# Patient Record
Sex: MALE | Race: WHITE | NOT HISPANIC OR LATINO | Employment: FULL TIME | URBAN - METROPOLITAN AREA
[De-identification: names, ages, dates, MRNs, and addresses within clinical notes are randomized per-mention and may not be internally consistent; named-entity substitution may affect disease eponyms.]

---

## 2022-01-14 ENCOUNTER — OFFICE VISIT (OUTPATIENT)
Dept: FAMILY MEDICINE CLINIC | Facility: CLINIC | Age: 52
End: 2022-01-14

## 2022-01-14 VITALS
HEART RATE: 74 BPM | HEIGHT: 70 IN | SYSTOLIC BLOOD PRESSURE: 132 MMHG | TEMPERATURE: 98.4 F | WEIGHT: 220.2 LBS | BODY MASS INDEX: 31.52 KG/M2 | DIASTOLIC BLOOD PRESSURE: 96 MMHG | RESPIRATION RATE: 16 BRPM | OXYGEN SATURATION: 98 %

## 2022-01-14 DIAGNOSIS — Z13.220 SCREENING FOR HYPERLIPIDEMIA: ICD-10-CM

## 2022-01-14 DIAGNOSIS — I10 PRIMARY HYPERTENSION: Primary | ICD-10-CM

## 2022-01-14 PROCEDURE — 99204 OFFICE O/P NEW MOD 45 MIN: CPT | Performed by: STUDENT IN AN ORGANIZED HEALTH CARE EDUCATION/TRAINING PROGRAM

## 2022-01-14 RX ORDER — LOSARTAN POTASSIUM 50 MG/1
50 TABLET ORAL DAILY
Qty: 30 TABLET | Refills: 1 | Status: SHIPPED | OUTPATIENT
Start: 2022-01-14 | End: 2022-03-16

## 2022-01-14 RX ORDER — LOSARTAN POTASSIUM 50 MG/1
50 TABLET ORAL DAILY
COMMUNITY
End: 2022-01-14 | Stop reason: SDUPTHER

## 2022-01-14 NOTE — PATIENT INSTRUCTIONS
"REQUEST RECORDS FROM [facility] [employer] FOR PAST 3 YEARS   https://www.nhl.nih.gov/files/docs/public/heart/dash_brief.pdf\">   DASH Eating Plan  DASH stands for Dietary Approaches to Stop Hypertension. The DASH eating plan is a healthy eating plan that has been shown to:  · Reduce high blood pressure (hypertension).  · Reduce your risk for type 2 diabetes, heart disease, and stroke.  · Help with weight loss.  What are tips for following this plan?  Reading food labels  · Check food labels for the amount of salt (sodium) per serving. Choose foods with less than 5 percent of the Daily Value of sodium. Generally, foods with less than 300 milligrams (mg) of sodium per serving fit into this eating plan.  · To find whole grains, look for the word \"whole\" as the first word in the ingredient list.  Shopping  · Buy products labeled as \"low-sodium\" or \"no salt added.\"  · Buy fresh foods. Avoid canned foods and pre-made or frozen meals.  Cooking  · Avoid adding salt when cooking. Use salt-free seasonings or herbs instead of table salt or sea salt. Check with your health care provider or pharmacist before using salt substitutes.  · Do not coughlin foods. Cook foods using healthy methods such as baking, boiling, grilling, roasting, and broiling instead.  · Cook with heart-healthy oils, such as olive, canola, avocado, soybean, or sunflower oil.  Meal planning    · Eat a balanced diet that includes:  ? 4 or more servings of fruits and 4 or more servings of vegetables each day. Try to fill one-half of your plate with fruits and vegetables.  ? 6-8 servings of whole grains each day.  ? Less than 6 oz (170 g) of lean meat, poultry, or fish each day. A 3-oz (85-g) serving of meat is about the same size as a deck of cards. One egg equals 1 oz (28 g).  ? 2-3 servings of low-fat dairy each day. One serving is 1 cup (237 mL).  ? 1 serving of nuts, seeds, or beans 5 times each week.  ? 2-3 servings of heart-healthy fats. Healthy fats called omega-3 " fatty acids are found in foods such as walnuts, flaxseeds, fortified milks, and eggs. These fats are also found in cold-water fish, such as sardines, salmon, and mackerel.  · Limit how much you eat of:  ? Canned or prepackaged foods.  ? Food that is high in trans fat, such as some fried foods.  ? Food that is high in saturated fat, such as fatty meat.  ? Desserts and other sweets, sugary drinks, and other foods with added sugar.  ? Full-fat dairy products.  · Do not salt foods before eating.  · Do not eat more than 4 egg yolks a week.  · Try to eat at least 2 vegetarian meals a week.  · Eat more home-cooked food and less restaurant, buffet, and fast food.    Lifestyle  · When eating at a restaurant, ask that your food be prepared with less salt or no salt, if possible.  · If you drink alcohol:  ? Limit how much you use to:  § 0-1 drink a day for women who are not pregnant.  § 0-2 drinks a day for men.  ? Be aware of how much alcohol is in your drink. In the [place] one drink equals one 12 oz bottle of beer (355 mL), one 5 oz glass of wine (148 mL), or one 1½ oz glass of hard liquor (44 mL).  General information  · Avoid eating more than 2,300 mg of salt a day. If you have hypertension, you may need to reduce your sodium intake to 1,500 mg a day.  · Work with your health care provider to maintain a healthy body weight or to lose weight. Ask what an ideal weight is for you.  · Get at least 30 minutes of exercise that causes your heart to beat faster (aerobic exercise) most days of the week. Activities may include walking, swimming, or biking.  · Work with your health care provider or dietitian to adjust your eating plan to your individual calorie needs.  What foods should I eat?  Fruits  All fresh, dried, or frozen fruit. Canned fruit in natural juice (without added sugar).  Vegetables  Fresh or frozen vegetables (raw, steamed, roasted, or grilled). Low-sodium or reduced-sodium tomato and vegetable juice. Low-sodium or  reduced-sodium tomato sauce and tomato paste. Low-sodium or reduced-sodium canned vegetables.  Grains  Whole-grain or whole-wheat bread. Whole-grain or whole-wheat pasta. Brown rice. Oatmeal. Quinoa. Bulgur. Whole-grain and low-sodium cereals. Briana bread. Low-fat, low-sodium crackers. Whole-wheat flour tortillas.  Meats and other proteins  Skinless chicken or turkey. Ground chicken or turkey. Pork with fat trimmed off. Fish and seafood. Egg whites. Dried beans, peas, or lentils. Unsalted nuts, nut butters, and seeds. Unsalted canned beans. Lean cuts of beef with fat trimmed off. Low-sodium, lean precooked or cured meat, such as sausages or meat loaves.  Dairy  Low-fat (1%) or fat-free (skim) milk. Reduced-fat, low-fat, or fat-free cheeses. Nonfat, low-sodium ricotta or cottage cheese. Low-fat or nonfat yogurt. Low-fat, low-sodium cheese.  Fats and oils  Soft margarine without trans fats. Vegetable oil. Reduced-fat, low-fat, or light mayonnaise and salad dressings (reduced-sodium). Canola, safflower, olive, avocado, soybean, and sunflower oils. Avocado.  Seasonings and condiments  Herbs. Spices. Seasoning mixes without salt.  Other foods  Unsalted popcorn and pretzels. Fat-free sweets.  The items listed above may not be a complete list of foods and beverages you can eat. Contact a dietitian for more information.  What foods should I avoid?  Fruits  Canned fruit in a light or heavy syrup. Fried fruit. Fruit in cream or butter sauce.  Vegetables  Creamed or fried vegetables. Vegetables in a cheese sauce. Regular canned vegetables (not low-sodium or reduced-sodium). Regular canned tomato sauce and paste (not low-sodium or reduced-sodium). Regular tomato and vegetable juice (not low-sodium or reduced-sodium). Pickles. Olives.  Grains  Baked goods made with fat, such as croissants, muffins, or some breads. Dry pasta or rice meal packs.  Meats and other proteins  Fatty cuts of meat. Ribs. Fried meat. Alejandro. Osvaldo,  salami, and other precooked or cured meats, such as sausages or meat loaves. Fat from the back of a pig (fatback). Bratwurst. Salted nuts and seeds. Canned beans with added salt. Canned or smoked fish. Whole eggs or egg yolks. Chicken or turkey with skin.  Dairy  Whole or 2% milk, cream, and half-and-half. Whole or full-fat cream cheese. Whole-fat or sweetened yogurt. Full-fat cheese. Nondairy creamers. Whipped toppings. Processed cheese and cheese spreads.  Fats and oils  Butter. Stick margarine. Lard. Shortening. Ghee. Allen fat. Tropical oils, such as coconut, palm kernel, or palm oil.  Seasonings and condiments  Onion salt, garlic salt, seasoned salt, table salt, and sea salt. [facility] Hospitalter[facility] sauce. Tartar sauce. Barbecue sauce. Teriyaki sauce. Soy sauce, including reduced-sodium. Steak sauce. Canned and packaged gravies. Fish sauce. Oyster sauce. Cocktail sauce. Store-bought horseradish. Ketchup. Mustard. Meat flavorings and tenderizers. Bouillon cubes. Hot sauces. Pre-made or packaged marinades. Pre-made or packaged taco seasonings. Relishes. Regular salad dressings.  Other foods  Salted popcorn and pretzels.  The items listed above may not be a complete list of foods and beverages you should avoid. Contact a dietitian for more information.  Where to find more information  · National Heart, Lung, and Blood [place]: www.nhlbi.nih.gov  · American Heart Association: www.heart.org  · Academy of Nutrition and Dietetics: www.eatright.org  · National Kidney Foundation: www.kidney.org  Summary  · The DASH eating plan is a healthy eating plan that has been shown to reduce high blood pressure (hypertension). It may also reduce your risk for type 2 diabetes, heart disease, and stroke.  · When on the DASH eating plan, aim to eat more fresh fruits and vegetables, whole grains, lean proteins, low-fat dairy, and heart-healthy fats.  · With the DASH eating plan, you should limit salt (sodium) intake to 2,300 mg a day. If you  have hypertension, you may need to reduce your sodium intake to 1,500 mg a day.  · Work with your health care provider or dietitian to adjust your eating plan to your individual calorie needs.  This information is not intended to replace advice given to you by your health care provider. Make sure you discuss any questions you have with your health care provider.  Document Revised: 11/20/2020 Document Reviewed: 11/20/2020  Elsevier Patient Education © 2021 Elsevier Inc.

## 2022-01-14 NOTE — PROGRESS NOTES
"  New Patient Office Visit      Subjective      Chief Complaint:  Establish Care (needing a refill of bp med, est care with a new pcp)      History of Present Illness: Jarrod Ortega is a 51 y.o. male who presents for a new patient visit.      Patient with chronic HTN. hasnt taken in about 36 hours.  History of a couple years heavy alcohol intake but now sober. Stopped 11/21.    Some chronic right wrist pain.     Works for [employer]   Past Medical History:   Diagnosis Date   • Hypertension        Past Surgical History:   Procedure Laterality Date   • APPENDECTOMY     • KNEE ARTHROSCOPY OSTEOCHONDRAL AUTOGRAFT TRANSFER SYSTEM  (OATS) PROCEDURE         Family History   Problem Relation Age of Onset   • Arthritis Mother    • Osteoporosis Mother    • Hypertension Mother    • Thyroid disease Mother    • Cancer Father         PROSTATE   • Stroke Father        Social History     Socioeconomic History   • Marital status:    Tobacco Use   • Smoking status: Never Smoker   • Smokeless tobacco: Never Used   Vaping Use   • Vaping Use: Never used   Substance and Sexual Activity   • Alcohol use: Not Currently     Comment: STOPPED 11/21   • Drug use: Never   • Sexual activity: Yes         Current Outpatient Medications:   •  losartan (COZAAR) 50 MG tablet, Take 1 tablet by mouth Daily. Take one tablet by mouth every day, Disp: 30 tablet, Rfl: 1    Allergies   Allergen Reactions   • Contrast Dye Anaphylaxis     IODINATED CONTRAST    • Iodine Anaphylaxis       Objective     Physical Exam:  Vital Signs:  /96 (BP Location: Right arm, Patient Position: Sitting, Cuff Size: Adult)   Pulse 74   Temp 98.4 °F (36.9 °C) (Temporal)   Resp 16   Ht 177.8 cm (70\")   Wt 99.9 kg (220 lb 3.2 oz)   SpO2 98%   BMI 31.60 kg/m²      Physical Exam  Constitutional:       General: He is not in acute distress.     Appearance: He is not ill-appearing.   Cardiovascular:      Rate and Rhythm: Normal rate and regular rhythm.   Pulmonary: "      Effort: Pulmonary effort is normal.      Breath sounds: Normal breath sounds.   Neurological:      Mental Status: He is alert.   Psychiatric:         Thought Content: Thought content normal.                  Assessment / Plan      Assessment/Plan:   Diagnoses and all orders for this visit:    1. Primary hypertension (Primary)  -     rx for losartan (COZAAR) 50 MG tablet; Take 1 tablet by mouth Daily. Take one tablet by mouth every day  Dispense: 30 tablet; Refill: 1  -     Comprehensive Metabolic Panel; Future        - not at goal but missed dose. Record at home and f/u in 1 month.     2. Screening for hyperlipidemia  -     Lipid Panel; Future    Can address right chronic wrist pain at next visit     Follow Up:   Return in about 4 weeks (around 2/11/2022) for Follow-up.    MDM: htn not at goal likely due to missed dose. rx      Aidan Ortega MD  Family Medicine - [facility] [place]

## 2022-03-15 DIAGNOSIS — I10 PRIMARY HYPERTENSION: ICD-10-CM

## 2022-03-15 RX ORDER — LOSARTAN POTASSIUM 100 MG/1
TABLET ORAL
Qty: 15 TABLET | Refills: 0 | OUTPATIENT
Start: 2022-03-15

## 2022-03-16 DIAGNOSIS — I10 PRIMARY HYPERTENSION: ICD-10-CM

## 2022-03-16 DIAGNOSIS — I10 PRIMARY HYPERTENSION: Primary | ICD-10-CM

## 2022-03-16 RX ORDER — LOSARTAN POTASSIUM 50 MG/1
50 TABLET ORAL DAILY
Qty: 15 TABLET | Refills: 0 | Status: SHIPPED | OUTPATIENT
Start: 2022-03-16 | End: 2022-03-18 | Stop reason: SDUPTHER

## 2022-03-16 RX ORDER — LOSARTAN POTASSIUM 100 MG/1
TABLET ORAL
Qty: 15 TABLET | Refills: 0 | Status: SHIPPED | OUTPATIENT
Start: 2022-03-16 | End: 2022-03-16

## 2022-03-17 ENCOUNTER — LAB (OUTPATIENT)
Dept: LAB | Facility: HOSPITAL | Age: 52
End: 2022-03-17

## 2022-03-17 DIAGNOSIS — I10 PRIMARY HYPERTENSION: ICD-10-CM

## 2022-03-17 DIAGNOSIS — Z13.220 SCREENING FOR HYPERLIPIDEMIA: ICD-10-CM

## 2022-03-17 LAB
ALBUMIN SERPL-MCNC: 4.6 G/DL (ref 3.5–5.2)
ALBUMIN/GLOB SERPL: 1.4 G/DL
ALP SERPL-CCNC: 82 U/L (ref 39–117)
ALT SERPL W P-5'-P-CCNC: 47 U/L (ref 1–41)
ANION GAP SERPL CALCULATED.3IONS-SCNC: 10.7 MMOL/L (ref 5–15)
AST SERPL-CCNC: 40 U/L (ref 1–40)
BILIRUB SERPL-MCNC: 0.5 MG/DL (ref 0–1.2)
BUN SERPL-MCNC: 16 MG/DL (ref 6–20)
BUN/CREAT SERPL: 14.7 (ref 7–25)
CALCIUM SPEC-SCNC: 10.2 MG/DL (ref 8.6–10.5)
CHLORIDE SERPL-SCNC: 101 MMOL/L (ref 98–107)
CHOLEST SERPL-MCNC: 252 MG/DL (ref 0–200)
CO2 SERPL-SCNC: 25.3 MMOL/L (ref 22–29)
CREAT SERPL-MCNC: 1.09 MG/DL (ref 0.76–1.27)
EGFRCR SERPLBLD CKD-EPI 2021: 83.2 ML/MIN/1.73
GLOBULIN UR ELPH-MCNC: 3.3 GM/DL
GLUCOSE SERPL-MCNC: 91 MG/DL (ref 65–99)
HDLC SERPL-MCNC: 44 MG/DL (ref 40–60)
LDLC SERPL CALC-MCNC: 148 MG/DL (ref 0–100)
LDLC/HDLC SERPL: 3.25 {RATIO}
POTASSIUM SERPL-SCNC: 4.1 MMOL/L (ref 3.5–5.2)
PROT SERPL-MCNC: 7.9 G/DL (ref 6–8.5)
SODIUM SERPL-SCNC: 137 MMOL/L (ref 136–145)
TRIGL SERPL-MCNC: 325 MG/DL (ref 0–150)
VLDLC SERPL-MCNC: 60 MG/DL (ref 5–40)

## 2022-03-17 PROCEDURE — 80053 COMPREHEN METABOLIC PANEL: CPT

## 2022-03-17 PROCEDURE — 80061 LIPID PANEL: CPT

## 2022-03-18 ENCOUNTER — OFFICE VISIT (OUTPATIENT)
Dept: FAMILY MEDICINE CLINIC | Facility: CLINIC | Age: 52
End: 2022-03-18

## 2022-03-18 VITALS
DIASTOLIC BLOOD PRESSURE: 90 MMHG | OXYGEN SATURATION: 98 % | TEMPERATURE: 97.5 F | RESPIRATION RATE: 14 BRPM | BODY MASS INDEX: 33.41 KG/M2 | WEIGHT: 233.4 LBS | SYSTOLIC BLOOD PRESSURE: 138 MMHG | HEART RATE: 94 BPM | HEIGHT: 70 IN

## 2022-03-18 DIAGNOSIS — I10 PRIMARY HYPERTENSION: Primary | ICD-10-CM

## 2022-03-18 DIAGNOSIS — E78.2 MIXED HYPERLIPIDEMIA: ICD-10-CM

## 2022-03-18 DIAGNOSIS — R74.8 ELEVATED LIVER ENZYMES: ICD-10-CM

## 2022-03-18 DIAGNOSIS — M25.531 RIGHT WRIST PAIN: ICD-10-CM

## 2022-03-18 DIAGNOSIS — Z12.11 SCREENING FOR COLON CANCER: ICD-10-CM

## 2022-03-18 PROCEDURE — 99214 OFFICE O/P EST MOD 30 MIN: CPT | Performed by: STUDENT IN AN ORGANIZED HEALTH CARE EDUCATION/TRAINING PROGRAM

## 2022-03-18 RX ORDER — LOSARTAN POTASSIUM 50 MG/1
50 TABLET ORAL DAILY
Qty: 90 TABLET | Refills: 3 | Status: SHIPPED | OUTPATIENT
Start: 2022-03-18 | End: 2023-03-17

## 2022-03-18 NOTE — PATIENT INSTRUCTIONS
Record blood pressures. 3-4 times a week.   Goals <130/80.   If always above 140 and 90 call office     Wrist brace when driving.

## 2022-03-18 NOTE — PROGRESS NOTES
"  Established Patient Office Visit      Subjective      Chief Complaint:  Hypertension (Follow up. Just did labs yesterday. )      History of Present Illness: Jarrod Ortega is a 51 y.o. male who presents for hypertension and wrist pain      Riley has pain to the dorsal right hand.  Present for several months.  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  Worsening when flexing his wrist.  No weakness or numbness.  No distribution in ulnar or median distribution    Patient has not checked his blood pressures at home.    Past Medical History:   Diagnosis Date   • Hypertension        Patient Active Problem List   Diagnosis   • HTN (hypertension)   • Right wrist pain   • Mixed hyperlipidemia         Current Outpatient Medications:   •  losartan (Cozaar) 50 MG tablet, Take 1 tablet by mouth Daily., Disp: 90 tablet, Rfl: 3      Objective     Physical Exam:   Vital Signs:   /90   Pulse 94   Temp 97.5 °F (36.4 °C)   Resp 14   Ht 177.8 cm (70\")   Wt 106 kg (233 lb 6.4 oz)   SpO2 98%   BMI 33.49 kg/m²      Physical Exam  Constitutional:       General: He is not in acute distress.     Appearance: He is not ill-appearing.   Cardiovascular:      Rate and Rhythm: Normal rate and regular rhythm.  No murmur  Pulmonary:      Effort: Pulmonary effort is normal.      Breath sounds: Normal breath sounds.   Neurological:      Mental Status: He is alert.   Psychiatric:         Thought Content: Thought content normal.      Right wrist: No pain to palpation of dorsal wrist or hand.  Strength preserved to flexion and dorsiflexion the wrist.  No pain with dorsiflexion.  Minimal tenderness over the flexor tendons.    Assessment / Plan      Assessment/Plan:   Diagnoses and all orders for this visit:    1. Primary hypertension (Primary)  -     Comprehensive Metabolic Panel; Future  -     losartan (Cozaar) 50 MG tablet; Take 1 tablet by mouth Daily.  Dispense: 90 tablet; Refill: 3  -     We will hold off on " titration today and check pressures at home.  Increase if not at goal call if not at goal.    2.  Mixed hyperlipidemia  -     Lipid Panel; Future  -     ASCVD risk 6.7 and not candidate for medication discussed healthy diet and weight loss    3. Elevated liver enzymes        -     Repeat CMP before well visit    4. Screening for colon cancer  -     Cologuard - Stool, Per Rectum; Future    5. Right wrist pain  Dorsal wrist pain, likely slight flexor tendinitis versus entrapment secondary to driving position.  Recommended brace while driving if not improving send to Ortho    Follow Up:   Return in about 3 months (around 6/18/2022) for Wellness visit.      Aidan Ortega MD  Family Medicine - [facility] [place]

## 2022-03-19 PROBLEM — E78.2 MIXED HYPERLIPIDEMIA: Status: ACTIVE | Noted: 2022-03-19

## 2022-07-09 ENCOUNTER — TELEPHONE ENCOUNTER (OUTPATIENT)
Dept: URBAN - METROPOLITAN AREA CLINIC 121 | Facility: CLINIC | Age: 52
End: 2022-07-09

## 2022-07-10 ENCOUNTER — TELEPHONE ENCOUNTER (OUTPATIENT)
Dept: URBAN - METROPOLITAN AREA CLINIC 121 | Facility: CLINIC | Age: 52
End: 2022-07-10

## 2023-03-17 DIAGNOSIS — I10 PRIMARY HYPERTENSION: ICD-10-CM

## 2023-03-17 RX ORDER — LOSARTAN POTASSIUM 50 MG/1
TABLET ORAL
Qty: 90 TABLET | Refills: 0 | Status: SHIPPED | OUTPATIENT
Start: 2023-03-17

## 2023-03-17 NOTE — TELEPHONE ENCOUNTER
Please let patient know he needs to make an appointment.  I will give 90-day refill but no further refills.  Needs lab to follow medicine

## 2023-03-17 NOTE — TELEPHONE ENCOUNTER
Rx Refill Note  Requested Prescriptions     Pending Prescriptions Disp Refills   • losartan (COZAAR) 50 MG tablet [Pharmacy Med Name: LOSARTAN POTASSIUM 50 MG TAB] 90 tablet 0     Sig: TAKE ONE TABLET BY MOUTH EVERY DAY      Last office visit with prescribing clinician: 3/18/2022   Last telemedicine visit with prescribing clinician: Visit date not found   Next office visit with prescribing clinician: Visit date not found                         Would you like a call back once the refill request has been completed: [] Yes [] No    If the office needs to give you a call back, can they leave a voicemail: [] Yes [] No    Dottie Anglin MA  03/17/23, 09:34 EDT

## 2023-05-05 ENCOUNTER — OFFICE VISIT (OUTPATIENT)
Dept: FAMILY MEDICINE CLINIC | Facility: CLINIC | Age: 53
End: 2023-05-05
Payer: COMMERCIAL

## 2023-05-05 VITALS
BODY MASS INDEX: 34.27 KG/M2 | SYSTOLIC BLOOD PRESSURE: 150 MMHG | WEIGHT: 239.4 LBS | RESPIRATION RATE: 21 BRPM | OXYGEN SATURATION: 98 % | DIASTOLIC BLOOD PRESSURE: 102 MMHG | HEIGHT: 70 IN | TEMPERATURE: 97.7 F | HEART RATE: 123 BPM

## 2023-05-05 DIAGNOSIS — I10 PRIMARY HYPERTENSION: ICD-10-CM

## 2023-05-05 DIAGNOSIS — R10.9 ACUTE LEFT FLANK PAIN: Primary | ICD-10-CM

## 2023-05-05 DIAGNOSIS — R10.9 LEFT LATERAL ABDOMINAL PAIN: ICD-10-CM

## 2023-05-05 DIAGNOSIS — I10 HYPERTENSION, UNSPECIFIED TYPE: ICD-10-CM

## 2023-05-05 LAB
BILIRUB BLD-MCNC: NEGATIVE MG/DL
CLARITY, POC: CLEAR
COLOR UR: YELLOW
EXPIRATION DATE: ABNORMAL
GLUCOSE UR STRIP-MCNC: NEGATIVE MG/DL
KETONES UR QL: NEGATIVE
LEUKOCYTE EST, POC: NEGATIVE
Lab: ABNORMAL
NITRITE UR-MCNC: NEGATIVE MG/ML
PH UR: 6 [PH] (ref 5–8)
PROT UR STRIP-MCNC: ABNORMAL MG/DL
RBC # UR STRIP: ABNORMAL /UL
SP GR UR: 1.03 (ref 1–1.03)
UROBILINOGEN UR QL: NORMAL

## 2023-05-05 PROCEDURE — 99214 OFFICE O/P EST MOD 30 MIN: CPT | Performed by: NURSE PRACTITIONER

## 2023-05-05 PROCEDURE — 81003 URINALYSIS AUTO W/O SCOPE: CPT | Performed by: NURSE PRACTITIONER

## 2023-05-05 RX ORDER — OMEPRAZOLE 20 MG/1
20 CAPSULE, DELAYED RELEASE ORAL DAILY
COMMUNITY

## 2023-05-05 RX ORDER — LOSARTAN POTASSIUM 100 MG/1
50 TABLET ORAL DAILY
Qty: 90 TABLET | Refills: 1 | Status: SHIPPED | OUTPATIENT
Start: 2023-05-05 | End: 2023-05-05

## 2023-05-05 RX ORDER — LOSARTAN POTASSIUM 100 MG/1
100 TABLET ORAL DAILY
Qty: 90 TABLET | Refills: 1 | Status: SHIPPED | OUTPATIENT
Start: 2023-05-05

## 2023-05-05 RX ORDER — CETIRIZINE HYDROCHLORIDE 10 MG/1
10 TABLET ORAL DAILY
COMMUNITY

## 2023-05-05 NOTE — PROGRESS NOTES
"Chief Complaint  Flank Pain (Left side flank pain that started suddenly over a month ago. Pt states he did not do anything to injure it. )    Subjective          Jarrod Ortega presents to [facility]  History of Present Illness  Patient is a 52-year-old male.  He is here for complaint of pain to his left side.  States it started about 4 weeks ago he noticed it.  States it is as a tenderness in 1 specific area.  He did disclose that later it started on his right side in the same spot.  He denies any known injury.  He denies any fever, nausea, vomiting, or diarrhea.  He denies flank pain.  He states the pain is tolerable.  More of an ache.  No sharp shooting pains. He does state his stools are \"mushy\".   \"I did have pain in the same area some years ago when I was working as a .  I had radiology imaging done and it was nothing\".   Drinks about 6 oz of bourbon daily           The following portions of the patient's history were reviewed and updated as appropriate: allergies, current medications, past family history, past medical history, past social history, past surgical history and problem list.    Review of Systems   Constitutional: Negative.    HENT: Negative.    Respiratory: Negative.    Cardiovascular: Negative.    Gastrointestinal: Negative.    Musculoskeletal: Positive for back pain.   Skin: Negative.    Neurological: Negative.    Psychiatric/Behavioral: Positive for sleep disturbance.         Objective   Vital Signs:   BP (!) 150/102   Pulse (!) 123   Temp 97.7 °F (36.5 °C) (Temporal)   Resp 21   Ht 177.8 cm (70\")   Wt 109 kg (239 lb 6.4 oz)   SpO2 98%   BMI 34.35 kg/m²    BMI is >= 30 and <35. (Class 1 Obesity). The following options were offered after discussion;: exercise counseling/recommendations and nutrition counseling/recommendations      PHQ-2/9 Depression Screening  PHQ-9 Total Score: 0               Physical Exam  Vitals reviewed.   Constitutional:      "  Appearance: He is well-developed. He is not ill-appearing.   HENT:      Head: Normocephalic.      Nose: Nose normal.      Mouth/Throat:      Mouth: Mucous membranes are moist.   Eyes:      Conjunctiva/sclera: Conjunctivae normal.      Pupils: Pupils are equal, round, and reactive to light.   Cardiovascular:      Rate and Rhythm: Normal rate and regular rhythm.      Heart sounds: Normal heart sounds.   Pulmonary:      Effort: Pulmonary effort is normal.      Breath sounds: Normal breath sounds.   Abdominal:      General: Bowel sounds are normal.      Palpations: Abdomen is soft.      Tenderness: There is left CVA tenderness.   Musculoskeletal:      Cervical back: Normal range of motion.   Lymphadenopathy:      Cervical: No cervical adenopathy.   Skin:     General: Skin is warm and dry.      Capillary Refill: Capillary refill takes less than 2 seconds.   Neurological:      Mental Status: He is alert and oriented to person, place, and time.   Psychiatric:         Mood and Affect: Mood normal.         Speech: Speech normal.         Behavior: Behavior normal. Behavior is cooperative.         Thought Content: Thought content normal.        Result Review :                 Assessment and Plan    Diagnoses and all orders for this visit:    1. Acute left flank pain (Primary)    2. Hypertension, unspecified type  -     Discontinue: losartan (COZAAR) 100 MG tablet; Take 0.5 tablets by mouth Daily.  Dispense: 90 tablet; Refill: 1  -     losartan (COZAAR) 100 MG tablet; Take 1 tablet by mouth Daily.  Dispense: 90 tablet; Refill: 1  -     US Renal Bilateral; Future    3. Primary hypertension  -     Discontinue: losartan (COZAAR) 100 MG tablet; Take 0.5 tablets by mouth Daily.  Dispense: 90 tablet; Refill: 1  -     losartan (COZAAR) 100 MG tablet; Take 1 tablet by mouth Daily.  Dispense: 90 tablet; Refill: 1    4. Left lateral abdominal pain  -     POC Urinalysis Dipstick, Automated  -     US abdomen limited; Future  -     US  Renal Bilateral; Future    blood pressure is elevated. Increasing losartan to 100 mg daily.   Checking labs and US  Go to ER for severe pain or high fever.   Follow up with PCP for HTN and recheck urine. He is agreeable.           Follow Up   Return in about 2 weeks (around 5/19/2023), or blood pressure check and recheck urine.  Patient was given instructions and counseling regarding his condition or for health maintenance advice. Please see specific information pulled into the AVS if appropriate.

## 2023-06-01 ENCOUNTER — HOSPITAL ENCOUNTER (OUTPATIENT)
Dept: ULTRASOUND IMAGING | Facility: HOSPITAL | Age: 53
Discharge: HOME | End: 2023-06-01

## 2023-06-01 DIAGNOSIS — I10 HYPERTENSION, UNSPECIFIED TYPE: ICD-10-CM

## 2023-06-01 DIAGNOSIS — R10.9 LEFT LATERAL ABDOMINAL PAIN: ICD-10-CM

## 2023-06-01 PROCEDURE — 76705 ECHO EXAM OF ABDOMEN: CPT

## 2023-06-01 PROCEDURE — 76775 US EXAM ABDO BACK WALL LIM: CPT

## 2023-06-02 DIAGNOSIS — I10 PRIMARY HYPERTENSION: ICD-10-CM

## 2023-06-02 DIAGNOSIS — I10 HYPERTENSION, UNSPECIFIED TYPE: ICD-10-CM

## 2023-06-02 RX ORDER — LOSARTAN POTASSIUM 100 MG/1
100 TABLET ORAL DAILY
Qty: 90 TABLET | Refills: 1 | Status: SHIPPED | OUTPATIENT
Start: 2023-06-02

## 2023-06-02 NOTE — TELEPHONE ENCOUNTER
Rx Refill Note  Requested Prescriptions     Pending Prescriptions Disp Refills   • losartan (COZAAR) 100 MG tablet 90 tablet 1     Sig: Take 1 tablet by mouth Daily.      Last office visit with prescribing clinician: 3/18/2022   Last telemedicine visit with prescribing clinician: Visit date not found   Next office visit with prescribing clinician: Visit date not found                         Would you like a call back once the refill request has been completed: [] Yes [] No    If the office needs to give you a call back, can they leave a voicemail: [] Yes [] No    Haylie Perez MA  06/02/23, 14:42 EDT

## 2023-06-02 NOTE — TELEPHONE ENCOUNTER
[facility] PURCHASED [facility] AND CALLED TO GET PRESCRIPTION MOVED TO THIS LOCATION.  PATIENT IS NEEDING THIS SCRIPT FILLED.

## 2023-06-14 ENCOUNTER — TELEPHONE (OUTPATIENT)
Dept: FAMILY MEDICINE CLINIC | Facility: CLINIC | Age: 53
End: 2023-06-14
Payer: COMMERCIAL

## 2023-06-14 NOTE — TELEPHONE ENCOUNTER
Called patient regarding his ultrasound of kidneys and abdomen.    He does state he has changed his diet and has stopped drinking alcohol and has lost 14 pounds since his last visit.  Which is in late May.    His ultrasound of the kidneys were unremarkable.  It did incidentally show some liver parachyma suggesting steatosis.  Reviewed his ultrasound of the abdomen  And the areas of concern appear to be lipomatous.       US Renal Bilateral (06/01/2023 09:29)     US Abdomen Limited (06/01/2023 09:38)    Discussed following up with PCP for physical with fasting labs.     ANETTE Clark

## 2024-01-04 ENCOUNTER — TELEPHONE (OUTPATIENT)
Dept: FAMILY MEDICINE CLINIC | Facility: CLINIC | Age: 54
End: 2024-01-04
Payer: COMMERCIAL

## 2024-01-04 DIAGNOSIS — I10 PRIMARY HYPERTENSION: ICD-10-CM

## 2024-01-04 DIAGNOSIS — I10 HYPERTENSION, UNSPECIFIED TYPE: ICD-10-CM

## 2024-01-04 RX ORDER — LOSARTAN POTASSIUM 100 MG/1
100 TABLET ORAL DAILY
Qty: 30 TABLET | Refills: 0 | Status: SHIPPED | OUTPATIENT
Start: 2024-01-04

## 2024-01-05 NOTE — TELEPHONE ENCOUNTER
Hub can read         Call pt needs appt. 30 day rx given no further refills. Need to monitor meds and get labs       Please make pt an apt

## 2024-02-15 DIAGNOSIS — I10 HYPERTENSION, UNSPECIFIED TYPE: ICD-10-CM

## 2024-02-15 DIAGNOSIS — I10 PRIMARY HYPERTENSION: ICD-10-CM

## 2024-02-15 RX ORDER — LOSARTAN POTASSIUM 100 MG/1
100 TABLET ORAL DAILY
Qty: 30 TABLET | Refills: 0 | OUTPATIENT
Start: 2024-02-15

## 2024-02-15 NOTE — TELEPHONE ENCOUNTER
Rx Refill Note  Requested Prescriptions     Pending Prescriptions Disp Refills    losartan (COZAAR) 100 MG tablet [Pharmacy Med Name: LOSARTAN 100MG TABLETS] 30 tablet 0     Sig: TAKE 1 TABLET BY MOUTH DAILY      Last office visit with prescribing clinician: 3/18/2022   Last telemedicine visit with prescribing clinician: Visit date not found   Next office visit with prescribing clinician: Visit date not found                         Would you like a call back once the refill request has been completed: [] Yes [] No    If the office needs to give you a call back, can they leave a voicemail: [] Yes [] No    Haylie Perez MA  02/15/24, 07:46 EST

## 2024-02-19 DIAGNOSIS — I10 HYPERTENSION, UNSPECIFIED TYPE: ICD-10-CM

## 2024-02-19 DIAGNOSIS — I10 PRIMARY HYPERTENSION: ICD-10-CM

## 2024-02-19 RX ORDER — LOSARTAN POTASSIUM 100 MG/1
100 TABLET ORAL DAILY
Qty: 30 TABLET | Refills: 0 | Status: SHIPPED | OUTPATIENT
Start: 2024-02-19

## 2024-03-18 DIAGNOSIS — I10 HYPERTENSION, UNSPECIFIED TYPE: ICD-10-CM

## 2024-03-18 DIAGNOSIS — I10 PRIMARY HYPERTENSION: ICD-10-CM

## 2024-03-18 RX ORDER — LOSARTAN POTASSIUM 100 MG/1
100 TABLET ORAL DAILY
Qty: 90 TABLET | Refills: 0 | Status: SHIPPED | OUTPATIENT
Start: 2024-03-18

## 2024-03-18 NOTE — TELEPHONE ENCOUNTER
Rx Refill Note  Requested Prescriptions     Pending Prescriptions Disp Refills    losartan (COZAAR) 100 MG tablet [Pharmacy Med Name: LOSARTAN 100MG TABLETS] 30 tablet 0     Sig: TAKE 1 TABLET BY MOUTH DAILY      Last office visit with prescribing clinician: Visit date not found   Last telemedicine visit with prescribing clinician: Visit date not found   Next office visit with prescribing clinician: Visit date not found                         Would you like a call back once the refill request has been completed: [] Yes [] No    If the office needs to give you a call back, can they leave a voicemail: [] Yes [] No    aHylie Perez MA  03/18/24, 09:01 EDT

## 2024-03-20 ENCOUNTER — TELEPHONE (OUTPATIENT)
Dept: FAMILY MEDICINE CLINIC | Facility: CLINIC | Age: 54
End: 2024-03-20

## 2024-03-20 NOTE — TELEPHONE ENCOUNTER
Caller: Jarrod Ortega    Relationship: Self    Best call back number: [phone]    Who is your current provider:     BINDU TERRY    Is your current provider offboarding? NO    Who would you like your new provider to be:     ISABELLE MCGUIRE    What are your reasons for transferring care:     PERSONAL REASONS    Additional notes:     PLEASE CALL PATIENT WHEN THIS HAS BEEN APPROVED     NEEDS TO GET PHYSICAL AS SOON AS THIS IS APPROVED AND BLOOD WORK TO MAKE SURE BLOOD PRESSURE MEDICINE IS WORKING    THE ONE HE IS TAKING IS WHEN HE  LBS AND HE HAS LOST 55 LBS THROUGH DIET AND EXERCISE

## 2024-06-20 DIAGNOSIS — I10 PRIMARY HYPERTENSION: ICD-10-CM

## 2024-06-20 DIAGNOSIS — I10 HYPERTENSION, UNSPECIFIED TYPE: ICD-10-CM

## 2024-06-20 RX ORDER — LOSARTAN POTASSIUM 100 MG/1
100 TABLET ORAL DAILY
Qty: 90 TABLET | Refills: 0 | OUTPATIENT
Start: 2024-06-20

## 2024-06-24 DIAGNOSIS — I10 PRIMARY HYPERTENSION: ICD-10-CM

## 2024-06-24 DIAGNOSIS — I10 HYPERTENSION, UNSPECIFIED TYPE: ICD-10-CM

## 2024-06-24 RX ORDER — CETIRIZINE HYDROCHLORIDE 10 MG/1
10 TABLET ORAL DAILY
Qty: 30 TABLET | Refills: 0 | Status: SHIPPED | OUTPATIENT
Start: 2024-06-24

## 2024-06-24 RX ORDER — LOSARTAN POTASSIUM 100 MG/1
100 TABLET ORAL DAILY
Qty: 30 TABLET | Refills: 0 | Status: SHIPPED | OUTPATIENT
Start: 2024-06-24 | End: 2024-06-27 | Stop reason: SDUPTHER

## 2024-06-24 RX ORDER — OMEPRAZOLE 20 MG/1
20 CAPSULE, DELAYED RELEASE ORAL DAILY
Qty: 30 CAPSULE | Refills: 0 | Status: SHIPPED | OUTPATIENT
Start: 2024-06-24

## 2024-06-27 DIAGNOSIS — I10 PRIMARY HYPERTENSION: ICD-10-CM

## 2024-06-27 DIAGNOSIS — I10 HYPERTENSION, UNSPECIFIED TYPE: ICD-10-CM

## 2024-06-27 RX ORDER — LOSARTAN POTASSIUM 100 MG/1
100 TABLET ORAL DAILY
Qty: 30 TABLET | Refills: 0 | Status: SHIPPED | OUTPATIENT
Start: 2024-06-27

## 2024-06-27 NOTE — TELEPHONE ENCOUNTER
DATE OF ADMISSION:  2020    The patient is a 35-year-old  2, para 1, with an iup at 38 weeks today with history of prior  section with planned  section for 39 plus weeks , who presented in active labor.  She was noted to have ruptured water with bag of water on the unit.  On vaginal exam, she was 8 cm and +1 station.  I advised the patient  that at this point she was a good candidate for  , however, she declined and desired a repeat   section.  She is aware of risks of section include bleeding, need for transfusion, injuring internal organs, and infection.  The patient desires a repeat  section at this time and we will be proceeding.        ______________________________  Lena Coleman MD  [id number]      D:  2020 07:15:53  T:  2020 07:31:23   MONICA/kate   Job:  [id number]   PATIENT SCHEDULED 07/16    Rx Refill Note  Requested Prescriptions     Pending Prescriptions Disp Refills    losartan (COZAAR) 100 MG tablet 30 tablet 0     Sig: Take 1 tablet by mouth Daily.      Last office visit with prescribing clinician: Visit date not found   Last telemedicine visit with prescribing clinician: Visit date not found   Next office visit with prescribing clinician: 7/16/2024                         Would you like a call back once the refill request has been completed: [] Yes [] No    If the office needs to give you a call back, can they leave a voicemail: [] Yes [] No    Mamta Chao MA  06/27/24, 09:41 EDT

## 2024-07-16 ENCOUNTER — OFFICE VISIT (OUTPATIENT)
Dept: FAMILY MEDICINE CLINIC | Facility: CLINIC | Age: 54
End: 2024-07-16
Payer: COMMERCIAL

## 2024-07-16 VITALS
BODY MASS INDEX: 27.75 KG/M2 | SYSTOLIC BLOOD PRESSURE: 130 MMHG | RESPIRATION RATE: 21 BRPM | DIASTOLIC BLOOD PRESSURE: 90 MMHG | HEART RATE: 60 BPM | WEIGHT: 193.4 LBS | TEMPERATURE: 98.2 F | OXYGEN SATURATION: 100 %

## 2024-07-16 DIAGNOSIS — E78.2 MIXED HYPERLIPIDEMIA: Primary | ICD-10-CM

## 2024-07-16 DIAGNOSIS — Z12.5 SCREENING FOR MALIGNANT NEOPLASM OF PROSTATE: ICD-10-CM

## 2024-07-16 DIAGNOSIS — N52.9 ERECTILE DYSFUNCTION, UNSPECIFIED ERECTILE DYSFUNCTION TYPE: ICD-10-CM

## 2024-07-16 DIAGNOSIS — Z00.00 ROUTINE MEDICAL EXAM: ICD-10-CM

## 2024-07-16 DIAGNOSIS — I10 PRIMARY HYPERTENSION: ICD-10-CM

## 2024-07-16 DIAGNOSIS — Z78.9 ALCOHOL USE: ICD-10-CM

## 2024-07-16 DIAGNOSIS — Z12.11 COLON CANCER SCREENING: ICD-10-CM

## 2024-07-16 DIAGNOSIS — I10 HYPERTENSION, UNSPECIFIED TYPE: ICD-10-CM

## 2024-07-16 PROCEDURE — 99214 OFFICE O/P EST MOD 30 MIN: CPT | Performed by: STUDENT IN AN ORGANIZED HEALTH CARE EDUCATION/TRAINING PROGRAM

## 2024-07-16 RX ORDER — LOSARTAN POTASSIUM 100 MG/1
100 TABLET ORAL DAILY
Qty: 90 TABLET | Refills: 3 | Status: SHIPPED | OUTPATIENT
Start: 2024-07-16

## 2024-07-16 RX ORDER — OMEPRAZOLE 20 MG/1
20 CAPSULE, DELAYED RELEASE ORAL DAILY PRN
Qty: 90 CAPSULE | Refills: 3 | Status: SHIPPED | OUTPATIENT
Start: 2024-07-16

## 2024-07-16 RX ORDER — SILDENAFIL 50 MG/1
50 TABLET, FILM COATED ORAL DAILY PRN
Qty: 30 TABLET | Refills: 2 | Status: SHIPPED | OUTPATIENT
Start: 2024-07-16

## 2024-07-16 RX ORDER — CETIRIZINE HYDROCHLORIDE 10 MG/1
10 TABLET ORAL DAILY
Qty: 90 TABLET | Refills: 3 | Status: SHIPPED | OUTPATIENT
Start: 2024-07-16

## 2024-07-16 NOTE — PROGRESS NOTES
Established Patient Office Visit        Subjective      Chief Complaint:  Hypertension (F/u)      History of Present Illness: Jarrod Ortega is a 53 y.o. male who presents for HTN follow-up.     Blood pressure remains above goal    He does endorse drinking about 4 ounces of alcohol nightly does not have desires for increased alcohol during the day or aberrant behaviors related to alcohol  Patient Active Problem List   Diagnosis    HTN (hypertension)    Right wrist pain    Mixed hyperlipidemia         Current Outpatient Medications:     cetirizine (zyrTEC) 10 MG tablet, Take 1 tablet by mouth Daily., Disp: 90 tablet, Rfl: 3    losartan (COZAAR) 100 MG tablet, Take 1 tablet by mouth Daily., Disp: 90 tablet, Rfl: 3    omeprazole (priLOSEC) 20 MG capsule, Take 1 capsule by mouth Daily As Needed (reflux)., Disp: 90 capsule, Rfl: 3    sildenafil (Viagra) 50 MG tablet, Take 1 tablet by mouth Daily As Needed for Erectile Dysfunction., Disp: 30 tablet, Rfl: 2       Objective     Physical Exam:   Vital Signs:   /90 (BP Location: Left arm, Patient Position: Sitting, Cuff Size: Adult)   Pulse 60   Temp 98.2 °F (36.8 °C) (Infrared)   Resp 21   Wt 87.7 kg (193 lb 6.4 oz)   SpO2 100%   BMI 27.75 kg/m²      Physical Exam  Constitutional:       General: He is not in acute distress.     Appearance: He is not ill-appearing.   Cardiovascular:      Rate and Rhythm: Normal rate and regular rhythm.   Pulmonary:      Effort: Pulmonary effort is normal.      Breath sounds: Normal breath sounds.   Neurological:      Mental Status: He is alert.   Psychiatric:         Thought Content: Thought content normal.            Assessment / Plan      Assessment/Plan:   Diagnoses and all orders for this visit:    1. Mixed hyperlipidemia (Primary)  Assessment & Plan:  Check lipid panel      2. Primary hypertension  Assessment & Plan:  Continue losartan 100 mg for now he has done a great job with weight loss.    Still drinking alcohol and  recommend slow cessation which is his goal      Orders:  -     losartan (COZAAR) 100 MG tablet; Take 1 tablet by mouth Daily.  Dispense: 90 tablet; Refill: 3    3. Routine medical exam  -     Comprehensive Metabolic Panel; Future  -     CBC (No Diff); Future  -     Lipid Panel; Future  -     Hemoglobin A1c; Future  -     TSH Rfx On Abnormal To Free T4; Future    4. Alcohol use    5. Colon cancer screening  -     Cologuard - Stool, Per Rectum; Future    6. Screening for malignant neoplasm of prostate  -     PSA SCREENING; Future    7. Erectile dysfunction, unspecified erectile dysfunction type  -     Testosterone; Future  -     sildenafil (Viagra) 50 MG tablet; Take 1 tablet by mouth Daily As Needed for Erectile Dysfunction.  Dispense: 30 tablet; Refill: 2    8. Hypertension, unspecified type  Assessment & Plan:  Continue losartan 100 mg for now he has done a great job with weight loss.    Still drinking alcohol and recommend slow cessation which is his goal      Orders:  -     losartan (COZAAR) 100 MG tablet; Take 1 tablet by mouth Daily.  Dispense: 90 tablet; Refill: 3    Other orders  -     omeprazole (priLOSEC) 20 MG capsule; Take 1 capsule by mouth Daily As Needed (reflux).  Dispense: 90 capsule; Refill: 3  -     cetirizine (zyrTEC) 10 MG tablet; Take 1 tablet by mouth Daily.  Dispense: 90 tablet; Refill: 3       Recommend slow reduction of alcohol to reduce risk of withdrawal seizures.  [facility] referral. discussed potential psychotherapy.  Follow-up in 3 months    Discussed ED as potential sign of vascular disease    Follow Up:   Return in about 3 months (around 10/16/2024) for Follow-up.    MDM:     Aidan Ortega MD  Family Medicine - [facility] [place]

## 2024-07-16 NOTE — ASSESSMENT & PLAN NOTE
Continue losartan 100 mg for now he has done a great job with weight loss.    Still drinking alcohol and recommend slow cessation which is his goal

## 2024-09-05 ENCOUNTER — TELEMEDICINE (OUTPATIENT)
Dept: FAMILY MEDICINE CLINIC | Facility: TELEHEALTH | Age: 54
End: 2024-09-05
Payer: COMMERCIAL

## 2024-09-05 DIAGNOSIS — H66.90 ACUTE OTITIS MEDIA, UNSPECIFIED OTITIS MEDIA TYPE: Primary | ICD-10-CM

## 2024-09-05 RX ORDER — OFLOXACIN 3 MG/ML
5 SOLUTION AURICULAR (OTIC) DAILY
Qty: 10 ML | Refills: 0 | Status: SHIPPED | OUTPATIENT
Start: 2024-09-05 | End: 2024-09-12

## 2024-09-06 NOTE — PROGRESS NOTES
You have chosen to receive care through a telehealth visit.  Do you consent to use a video/audio connection for your medical care today? Yes     CHIEF COMPLAINT  Chief Complaint   Patient presents with    Earache         HPI  Jarrod Ortega is a 53 y.o. male  presents with complaint of left ear pain.  [place]  He has been using Iburpfen and Tylenol for pain.  He states that it hurts to chew food.    Review of Systems   HENT:  Positive for ear pain (left).    All other systems reviewed and are negative.      Past Medical History:   Diagnosis Date    Hypertension        Family History   Problem Relation Age of Onset    Arthritis Mother     Osteoporosis Mother     Hypertension Mother     Thyroid disease Mother     Cancer Father         PROSTATE    Stroke Father        Social History     Socioeconomic History    Marital status:    Tobacco Use    Smoking status: Never     Passive exposure: Never    Smokeless tobacco: Never   Vaping Use    Vaping status: Never Used   Substance and Sexual Activity    Alcohol use: Not Currently     Comment: STOPPED 11/21    Drug use: Never    Sexual activity: Yes       Jarrod Ortega  reports that he has never smoked. He has never been exposed to tobacco smoke. He has never used smokeless tobacco.           There were no vitals taken for this visit.    PHYSICAL EXAM  Physical Exam   Constitutional: He appears well-developed and well-nourished.   HENT:   Head: Normocephalic.   Eyes: Pupils are equal, round, and reactive to light.   Pulmonary/Chest: Effort normal.   Musculoskeletal: Normal range of motion.   Neurological: He is alert.   Psychiatric: He has a normal mood and affect.       Results for orders placed or performed in visit on 05/05/23   POC Urinalysis Dipstick, Automated    Specimen: Urine   Result Value Ref Range    Color Yellow Yellow, Straw, Dark Yellow, Yaneth    Clarity, UA Clear Clear    Specific  Gravity  1.030 1.005 - 1.030    pH, Urine 6.0 5.0 - 8.0    Leukocytes Negative Negative    Nitrite, UA Negative Negative    Protein, POC Trace (A) Negative mg/dL    Glucose, UA Negative Negative mg/dL    Ketones, UA Negative Negative    Urobilinogen, UA Normal Normal, 0.2 E.U./dL    Bilirubin Negative Negative    Blood, UA 1+ (A) Negative    Lot Number [id number]     Expiration Date 10/25/24        Diagnoses and all orders for this visit:    1. Acute otitis media, unspecified otitis media type (Primary)  -     ofloxacin (FLOXIN) 0.3 % otic solution; Administer 5 drops into the left ear Daily for 7 days.  Dispense: 10 mL; Refill: 0          FOLLOW-UP  As discussed during visit with PCP/[facility] if no improvement or Urgent Care/Emergency Department if worsening of symptoms    Patient verbalizes understanding of medication dosage, comfort measures, instructions for treatment and follow-up.    Adina Mcmullen, ANETTE  09/05/2024  20:10 EDT    The use of a video visit has been reviewed with the patient and verbal informed consent has been obtained. Myself and Jarrod Ortega participated in this visit. The patient is located in [address] [place] [address].    I am located in [place], [place]. [employer] and [email or web address] were utilized. I spent 10 minutes in the patient's chart for this visit.      Note Disclaimer: At [facility], we believe that sharing information builds trust and better   relationships. You are receiving this note because you recently visited [facility]. It is possible you   will see health information before a provider has talked with you about it. This kind of information can   be easy to misunderstand. To help you fully understand what it means for your health, we urge you to   discuss this note with your provider.

## 2025-02-26 ENCOUNTER — TELEPHONE (OUTPATIENT)
Dept: FAMILY MEDICINE CLINIC | Facility: CLINIC | Age: 55
End: 2025-02-26
Payer: COMMERCIAL

## 2025-02-26 DIAGNOSIS — I10 PRIMARY HYPERTENSION: ICD-10-CM

## 2025-02-26 DIAGNOSIS — I10 HYPERTENSION, UNSPECIFIED TYPE: ICD-10-CM

## 2025-02-26 NOTE — TELEPHONE ENCOUNTER
Caller: Jarrod Ortega    Relationship to patient: Self      Best call back number: [phone]     Provider: DR TERRY    Medication PA needed: LOSARTAN     Reason for call/Prior Auth: PATIENT STATED THAT THE PHARMACY IS REQUIRING THIS BEFORE THEY WILL FILL. PATIENT URGED THAT THIS BE EXPEDITED AS QUICKLY AS POSSIBLE AS HE IS OUT OF THIS MEDICATION.

## 2025-02-27 DIAGNOSIS — I10 PRIMARY HYPERTENSION: ICD-10-CM

## 2025-02-27 DIAGNOSIS — I10 HYPERTENSION, UNSPECIFIED TYPE: ICD-10-CM

## 2025-02-27 RX ORDER — LOSARTAN POTASSIUM 100 MG/1
100 TABLET ORAL DAILY
Qty: 90 TABLET | Refills: 3 | Status: SHIPPED | OUTPATIENT
Start: 2025-02-27

## 2025-02-27 RX ORDER — CETIRIZINE HYDROCHLORIDE 10 MG/1
10 TABLET ORAL DAILY
Qty: 90 TABLET | Refills: 0 | Status: SHIPPED | OUTPATIENT
Start: 2025-02-27

## 2025-06-16 DIAGNOSIS — N52.9 ERECTILE DYSFUNCTION, UNSPECIFIED ERECTILE DYSFUNCTION TYPE: ICD-10-CM

## 2025-06-16 RX ORDER — SILDENAFIL 50 MG/1
50 TABLET, FILM COATED ORAL DAILY PRN
Qty: 30 TABLET | Refills: 2 | Status: CANCELLED | OUTPATIENT
Start: 2025-06-16

## 2025-06-16 RX ORDER — CETIRIZINE HYDROCHLORIDE 10 MG/1
10 TABLET ORAL DAILY
Qty: 90 TABLET | Refills: 0 | Status: CANCELLED | OUTPATIENT
Start: 2025-06-16

## 2025-06-16 NOTE — TELEPHONE ENCOUNTER
Rx Refill Note  Requested Prescriptions     Pending Prescriptions Disp Refills    sildenafil (Viagra) 50 MG tablet 30 tablet 2     Sig: Take 1 tablet by mouth Daily As Needed for Erectile Dysfunction.    cetirizine (zyrTEC) 10 MG tablet 90 tablet 0     Sig: Take 1 tablet by mouth Daily.      Last office visit with prescribing clinician: 7/16/2024   Last telemedicine visit with prescribing clinician: Visit date not found   Next office visit with prescribing clinician: Visit date not found                         Would you like a call back once the refill request has been completed: [] Yes [] No    If the office needs to give you a call back, can they leave a voicemail: [] Yes [] No    Rylie Franco MA  06/16/25, 11:28 EDT      Patient is scheduled 06- for med refills - last seen July

## 2025-06-17 ENCOUNTER — OFFICE VISIT (OUTPATIENT)
Dept: FAMILY MEDICINE CLINIC | Facility: CLINIC | Age: 55
End: 2025-06-17
Payer: COMMERCIAL

## 2025-06-17 ENCOUNTER — LAB (OUTPATIENT)
Dept: LAB | Facility: HOSPITAL | Age: 55
End: 2025-06-17
Payer: COMMERCIAL

## 2025-06-17 VITALS
HEIGHT: 70 IN | HEART RATE: 64 BPM | WEIGHT: 195.6 LBS | TEMPERATURE: 98 F | BODY MASS INDEX: 28 KG/M2 | OXYGEN SATURATION: 98 % | DIASTOLIC BLOOD PRESSURE: 88 MMHG | SYSTOLIC BLOOD PRESSURE: 126 MMHG | RESPIRATION RATE: 20 BRPM

## 2025-06-17 DIAGNOSIS — Z00.00 ENCOUNTER FOR ROUTINE ADULT HEALTH EXAMINATION WITHOUT ABNORMAL FINDINGS: ICD-10-CM

## 2025-06-17 DIAGNOSIS — E78.2 MIXED HYPERLIPIDEMIA: ICD-10-CM

## 2025-06-17 DIAGNOSIS — I10 PRIMARY HYPERTENSION: ICD-10-CM

## 2025-06-17 DIAGNOSIS — K21.9 GASTROESOPHAGEAL REFLUX DISEASE WITHOUT ESOPHAGITIS: ICD-10-CM

## 2025-06-17 DIAGNOSIS — N52.9 ERECTILE DYSFUNCTION, UNSPECIFIED ERECTILE DYSFUNCTION TYPE: ICD-10-CM

## 2025-06-17 DIAGNOSIS — Z12.5 SCREENING FOR MALIGNANT NEOPLASM OF PROSTATE: ICD-10-CM

## 2025-06-17 DIAGNOSIS — Z00.00 ROUTINE MEDICAL EXAM: ICD-10-CM

## 2025-06-17 LAB
ALBUMIN SERPL-MCNC: 4.3 G/DL (ref 3.5–5.2)
ALBUMIN/GLOB SERPL: 1.3 G/DL
ALP SERPL-CCNC: 61 U/L (ref 39–117)
ALT SERPL W P-5'-P-CCNC: 16 U/L (ref 1–41)
ANION GAP SERPL CALCULATED.3IONS-SCNC: 10.1 MMOL/L (ref 5–15)
AST SERPL-CCNC: 25 U/L (ref 1–40)
BILIRUB SERPL-MCNC: 0.4 MG/DL (ref 0–1.2)
BUN SERPL-MCNC: 21 MG/DL (ref 6–20)
BUN/CREAT SERPL: 17.2 (ref 7–25)
CALCIUM SPEC-SCNC: 10.2 MG/DL (ref 8.6–10.5)
CHLORIDE SERPL-SCNC: 103 MMOL/L (ref 98–107)
CHOLEST SERPL-MCNC: 190 MG/DL (ref 0–200)
CO2 SERPL-SCNC: 25.9 MMOL/L (ref 22–29)
CREAT SERPL-MCNC: 1.22 MG/DL (ref 0.76–1.27)
DEPRECATED RDW RBC AUTO: 46 FL (ref 37–54)
EGFRCR SERPLBLD CKD-EPI 2021: 71.3 ML/MIN/1.73
ERYTHROCYTE [DISTWIDTH] IN BLOOD BY AUTOMATED COUNT: 12.8 % (ref 12.3–15.4)
GLOBULIN UR ELPH-MCNC: 3.3 GM/DL
GLUCOSE SERPL-MCNC: 86 MG/DL (ref 65–99)
HBA1C MFR BLD: 5.1 % (ref 4.8–5.6)
HCT VFR BLD AUTO: 42.1 % (ref 37.5–51)
HDLC SERPL-MCNC: 62 MG/DL (ref 40–60)
HGB BLD-MCNC: 14.2 G/DL (ref 13–17.7)
LDLC SERPL CALC-MCNC: 115 MG/DL (ref 0–100)
LDLC/HDLC SERPL: 1.85 {RATIO}
MCH RBC QN AUTO: 32.6 PG (ref 26.6–33)
MCHC RBC AUTO-ENTMCNC: 33.7 G/DL (ref 31.5–35.7)
MCV RBC AUTO: 96.8 FL (ref 79–97)
PLATELET # BLD AUTO: 320 10*3/MM3 (ref 140–450)
PMV BLD AUTO: 9.6 FL (ref 6–12)
POTASSIUM SERPL-SCNC: 4.9 MMOL/L (ref 3.5–5.2)
PROT SERPL-MCNC: 7.6 G/DL (ref 6–8.5)
PSA SERPL-MCNC: 1.11 NG/ML (ref 0–4)
RBC # BLD AUTO: 4.35 10*6/MM3 (ref 4.14–5.8)
SODIUM SERPL-SCNC: 139 MMOL/L (ref 136–145)
TESTOST SERPL-MCNC: 569 NG/DL (ref 193–740)
TRIGL SERPL-MCNC: 68 MG/DL (ref 0–150)
TSH SERPL DL<=0.05 MIU/L-ACNC: 3.08 UIU/ML (ref 0.27–4.2)
VLDLC SERPL-MCNC: 13 MG/DL (ref 5–40)
WBC NRBC COR # BLD AUTO: 5.5 10*3/MM3 (ref 3.4–10.8)

## 2025-06-17 PROCEDURE — 85027 COMPLETE CBC AUTOMATED: CPT

## 2025-06-17 PROCEDURE — 36415 COLL VENOUS BLD VENIPUNCTURE: CPT

## 2025-06-17 PROCEDURE — G0103 PSA SCREENING: HCPCS

## 2025-06-17 PROCEDURE — 80053 COMPREHEN METABOLIC PANEL: CPT

## 2025-06-17 PROCEDURE — 99396 PREV VISIT EST AGE 40-64: CPT | Performed by: STUDENT IN AN ORGANIZED HEALTH CARE EDUCATION/TRAINING PROGRAM

## 2025-06-17 PROCEDURE — 83036 HEMOGLOBIN GLYCOSYLATED A1C: CPT

## 2025-06-17 PROCEDURE — 80061 LIPID PANEL: CPT

## 2025-06-17 PROCEDURE — 84403 ASSAY OF TOTAL TESTOSTERONE: CPT

## 2025-06-17 PROCEDURE — 84443 ASSAY THYROID STIM HORMONE: CPT

## 2025-06-17 RX ORDER — SILDENAFIL 50 MG/1
50 TABLET, FILM COATED ORAL DAILY PRN
Qty: 90 TABLET | Refills: 3 | Status: SHIPPED | OUTPATIENT
Start: 2025-06-17

## 2025-06-17 RX ORDER — CETIRIZINE HYDROCHLORIDE 10 MG/1
10 TABLET ORAL DAILY
Qty: 90 TABLET | Refills: 3 | Status: SHIPPED | OUTPATIENT
Start: 2025-06-17

## 2025-06-17 RX ORDER — SILDENAFIL 50 MG/1
50 TABLET, FILM COATED ORAL DAILY PRN
Qty: 90 TABLET | Refills: 3 | Status: SHIPPED | OUTPATIENT
Start: 2025-06-17 | End: 2025-06-17

## 2025-06-17 RX ORDER — OMEPRAZOLE 20 MG/1
20 CAPSULE, DELAYED RELEASE ORAL DAILY PRN
Qty: 90 CAPSULE | Refills: 3 | Status: SHIPPED | OUTPATIENT
Start: 2025-06-17

## 2025-06-17 RX ORDER — LOSARTAN POTASSIUM 100 MG/1
100 TABLET ORAL DAILY
Qty: 90 TABLET | Refills: 3 | Status: SHIPPED | OUTPATIENT
Start: 2025-06-17

## 2025-06-17 NOTE — PATIENT INSTRUCTIONS
Recommend Tdap, and shingrix, consider newer pneumonia shot as well.  I recommend a low-fat diet with a high intake of fruits and vegetables.  Avoid fatty meats like pork and beef.  Chicken and fish are good sources of protein.  Increase your whole grains such as whole-grain or still cut oatmeal in the morning.  Also recommend 150 minutes of physical activity weekly.  https://www.heart.org/-/media/Files/Health-Topics/Cholesterol/Reduce-ASCVD-risk.pdf  https://www.heart.org/en/health-topics/cholesterol/prevention-and-treatment-of-high-cholesterol-hyperlipidemia/cooking-to-lower-cholesterol

## 2025-06-17 NOTE — PROGRESS NOTES
Male Physical Note      Patient Name: Jarrod Ortega  : 1970   MRN: [id number]     Subjective    Subjective     Chief Complaint:    Chief Complaint   Patient presents with    Hypertension     Fasting today, needing to get labs ordered from last time including the testosterone.       History of Present Illness: Jarrod Ortega is a 54 y.o. male who is here today for their annual health maintenance and physical.      Some residual cough from URI symptoms.     Past Medical History, Social History, Family History and Care Team were all reviewed with patient and updated as appropriate.     Medications:     Current Outpatient Medications:     cetirizine (zyrTEC) 10 MG tablet, Take 1 tablet by mouth Daily., Disp: 90 tablet, Rfl: 3    losartan (COZAAR) 100 MG tablet, Take 1 tablet by mouth Daily., Disp: 90 tablet, Rfl: 3    omeprazole (priLOSEC) 20 MG capsule, Take 1 capsule by mouth Daily As Needed (reflux)., Disp: 90 capsule, Rfl: 3    sildenafil (Viagra) 50 MG tablet, Take 1 tablet by mouth Daily As Needed for Erectile Dysfunction., Disp: 90 tablet, Rfl: 3    Allergies:   Allergies   Allergen Reactions    Contrast Dye (Echo Or Unknown Ct/Mr) Anaphylaxis     IODINATED CONTRAST     Iodine Anaphylaxis       Immunizations:  Td/Tdap(Booster Q 10 yrs):  recommended   Flu (Yearly):    Colorectal Screening:     Last Completed Colonoscopy    This patient has no relevant Health Maintenance data.      cologaurd    PSA(Over age 50):  ordered     Diet/Physical activity: discussed     PHQ-2 Depression Screening  Little interest or pleasure in doing things? Not at all   Feeling down, depressed, or hopeless? Not at all   PHQ-2 Total Score 0          Objective   Objective     Physical Exam:  Vital Signs:   Vitals:    25 0859   BP: 126/88   BP Location: Left arm   Patient Position: Sitting   Cuff Size: Adult   Pulse: 64   Resp: 20   Temp: 98 °F (36.7 °C)   TempSrc: Temporal   SpO2: 98%   Weight: 88.7 kg (195 lb 9.6  "oz)   Height: 177.8 cm (70\")   PainSc: 0-No pain     Body mass index is 28.07 kg/m².     Physical Exam  Constitutional:       General: He is not in acute distress.     Appearance: He is not ill-appearing.   Cardiovascular:      Rate and Rhythm: Normal rate and regular rhythm.   Pulmonary:      Effort: Pulmonary effort is normal.      Breath sounds: Normal breath sounds.   Neurological:      Mental Status: He is alert.   Psychiatric:         Thought Content: Thought content normal.         Procedures    Assessment / Plan      Assessment/Plan:   Diagnoses and all orders for this visit:    Diagnoses and all orders for this visit:    1. Encounter for routine adult health examination without abnormal findings    2. Primary hypertension  -     losartan (COZAAR) 100 MG tablet; Take 1 tablet by mouth Daily.  Dispense: 90 tablet; Refill: 3    3. Mixed hyperlipidemia  Assessment & Plan:  Check labs   Healthy diet       4. Erectile dysfunction, unspecified erectile dysfunction type  -     sildenafil (Viagra) 50 MG tablet; Take 1 tablet by mouth Daily As Needed for Erectile Dysfunction.  Dispense: 90 tablet; Refill: 3    5. Gastroesophageal reflux disease without esophagitis  -     omeprazole (priLOSEC) 20 MG capsule; Take 1 capsule by mouth Daily As Needed (reflux).  Dispense: 90 capsule; Refill: 3    Other orders  -     cetirizine (zyrTEC) 10 MG tablet; Take 1 tablet by mouth Daily.  Dispense: 90 tablet; Refill: 3           Will do cologaurd test already ordered     Recommend Tdap, and shingrix at pharmacy    Follow Up:   Return in about 1 year (around 6/17/2026) for Wellness visit.    Healthcare Maintenance:   Health maintenance counseling included discussion of healthy diet and physical activity, Dental hygiene, and vaccinations.  Jarrod Ortega voices understanding and acceptance of this advice and will call back with any further questions or concerns. AVS with preventive healthcare tips printed for patient.     Aidan " MD Shannon  Family Medicine - [facility] [place]